# Patient Record
Sex: FEMALE | Race: WHITE | NOT HISPANIC OR LATINO | Employment: UNEMPLOYED | URBAN - METROPOLITAN AREA
[De-identification: names, ages, dates, MRNs, and addresses within clinical notes are randomized per-mention and may not be internally consistent; named-entity substitution may affect disease eponyms.]

---

## 2023-04-24 ENCOUNTER — VIRTUAL VISIT (OUTPATIENT)
Dept: FAMILY MEDICINE | Facility: CLINIC | Age: 33
End: 2023-04-24
Payer: COMMERCIAL

## 2023-04-24 DIAGNOSIS — N76.0 VAGINITIS AND VULVOVAGINITIS: Primary | ICD-10-CM

## 2023-04-24 DIAGNOSIS — Z11.3 SCREEN FOR STD (SEXUALLY TRANSMITTED DISEASE): ICD-10-CM

## 2023-04-24 DIAGNOSIS — R30.0 DYSURIA: ICD-10-CM

## 2023-04-24 PROCEDURE — 99203 OFFICE O/P NEW LOW 30 MIN: CPT | Mod: 93 | Performed by: NURSE PRACTITIONER

## 2023-04-24 NOTE — PROGRESS NOTES
Kory is a 32 year old who is being evaluated via a billable telephone visit.      What phone number would you like to be contacted at? [phone]  How would you like to obtain your AVS? Mail a copy  Distant Location (provider location):  On-site    Assessment & Plan     Vaginitis and vulvovaginitis  Outpatient labs ordered.  She can get this at any [place] location.  Plan of care based on lab results.  Kory verbalizes understanding of plan of care and is in agreement.   - Wet prep - lab collect  - NEISSERIA GONORRHOEA PCR  - CHLAMYDIA TRACHOMATIS PCR    Dysuria    - UA with Microscopic - lab collect  - Wet prep - lab collect  - NEISSERIA GONORRHOEA PCR  - CHLAMYDIA TRACHOMATIS PCR    Screen for STD (sexually transmitted disease)    - NEISSERIA GONORRHOEA PCR  - CHLAMYDIA TRACHOMATIS PCR      Nicotine/Tobacco Cessation:  She reports that she has been smoking cigarettes. She has a 2.50 pack-year smoking history. She does not have any smokeless tobacco history on file.  Nicotine/Tobacco Cessation Plan:   Information offered: Patient not interested at this time      Return in about 1 day (around 4/25/2023) for Lab only appointment.      [facility]   Kory is a 32 year old, presenting for the following health issues:  Vaginal Problem        4/24/2023     4:38 PM   Additional Questions   Roomed by Marlee CELIS     Genitourinary - Female  Onset/Duration: 3-4 days  Description:   Painful urination (Dysuria): YES           Frequency: No  Blood in urine (Hematuria): No  Delay in urine (Hesitency): No  Intensity: mild uncomfortable  Progression of Symptoms:  same  Accompanying Signs & Symptoms:  Fever/chills: No  Flank pain: No  Nausea and vomiting: No  Vaginal symptoms: discharge, odor and itching  Abdominal/Pelvic Pain: No  History:   History of frequent UTI s: YES yeast infections-BV  History of kidney stones: No  Sexually Active:  YES  Possibility of pregnancy: Don't Know  Precipitating or alleviating factors: None  Therapies tried and outcome: Increase fluid intake      Usually sees a different health system.  Was thinking her location was closer to where she lives.  Does report a new sexual partner since January.      Review of Systems   Constitutional, HEENT, cardiovascular, pulmonary, GI, , musculoskeletal, neuro, skin, endocrine and psych systems are negative, except as otherwise noted in the HPI.          Objective           Vitals:  No vitals were obtained today due to virtual visit.    Physical Exam   healthy, alert and no distress  PSYCH: Alert and oriented times 3; coherent speech, normal   rate and volume, able to articulate logical thoughts, able   to abstract reason, no tangential thoughts, no hallucinations   or delusions  Her affect is normal and pleasant  RESP: No cough, no audible wheezing, able to talk in full sentences  Remainder of exam unable to be completed due to telephone visits      Phone call duration: 5 minutes